# Patient Record
Sex: MALE | Race: WHITE | NOT HISPANIC OR LATINO | URBAN - METROPOLITAN AREA
[De-identification: names, ages, dates, MRNs, and addresses within clinical notes are randomized per-mention and may not be internally consistent; named-entity substitution may affect disease eponyms.]

---

## 2022-07-22 ENCOUNTER — OFFICE (OUTPATIENT)
Dept: URBAN - METROPOLITAN AREA CLINIC 102 | Facility: CLINIC | Age: 62
End: 2022-07-22

## 2022-07-22 VITALS
HEART RATE: 57 BPM | DIASTOLIC BLOOD PRESSURE: 85 MMHG | TEMPERATURE: 97.9 F | HEIGHT: 70 IN | WEIGHT: 191 LBS | SYSTOLIC BLOOD PRESSURE: 145 MMHG

## 2022-07-22 DIAGNOSIS — I25.10 ATHEROSCLEROTIC HEART DISEASE OF NATIVE CORONARY ARTERY WITH: ICD-10-CM

## 2022-07-22 DIAGNOSIS — Z86.010 PERSONAL HISTORY OF COLONIC POLYPS: ICD-10-CM

## 2022-07-22 PROCEDURE — 99203 OFFICE O/P NEW LOW 30 MIN: CPT

## 2022-07-22 NOTE — SERVICEHPINOTES
SYEDA PONCE   is a   62   year old male who is being seen in consultation at the request of   GERHARD LINK   for OV prior to colonoscopy. His last colonoscopy was in 2014 with benign adenomatous polyps recall 3 years. Denies GI symptoms. BMs are regular, no changes in bowel habits. Occ has looser stool if he eats too much dairy but o/w no issues. No rectal bleeding or abd pain. No family hx of colon cancer. br He had a MI (in [place]) Jan 2019, s/p stent. Has done well since. Not on any blood thinners. Cardiologist is Dr Thornton in [place], follows every 6 months, last time was 6-8 weeks ago. Denies chest pain, SOB, or palpitations.

## 2022-10-28 ENCOUNTER — ON CAMPUS - OUTPATIENT (OUTPATIENT)
Dept: URBAN - METROPOLITAN AREA HOSPITAL 16 | Facility: HOSPITAL | Age: 62
End: 2022-10-28
Payer: COMMERCIAL

## 2022-10-28 DIAGNOSIS — D12.4 BENIGN NEOPLASM OF DESCENDING COLON: ICD-10-CM

## 2022-10-28 DIAGNOSIS — Z86.010 PERSONAL HISTORY OF COLONIC POLYPS: ICD-10-CM

## 2022-10-28 PROCEDURE — 45385 COLONOSCOPY W/LESION REMOVAL: CPT | Performed by: INTERNAL MEDICINE
